# Patient Record
Sex: MALE | Race: WHITE | URBAN - METROPOLITAN AREA
[De-identification: names, ages, dates, MRNs, and addresses within clinical notes are randomized per-mention and may not be internally consistent; named-entity substitution may affect disease eponyms.]

---

## 2020-08-26 ENCOUNTER — NEW PATIENT (OUTPATIENT)
Dept: URBAN - METROPOLITAN AREA CLINIC 51 | Facility: CLINIC | Age: 30
End: 2020-08-26
Payer: COMMERCIAL

## 2020-08-26 DIAGNOSIS — H50.52 PRESBYOPIA: Primary | ICD-10-CM

## 2020-08-26 DIAGNOSIS — H04.123 TEAR FILM INSUFFICIENCY OF BILATERAL LACRIMAL GLANDS: ICD-10-CM

## 2020-08-26 DIAGNOSIS — Z96.1 PRESENCE OF INTRAOCULAR LENS: ICD-10-CM

## 2020-08-26 PROCEDURE — 92004 COMPRE OPH EXAM NEW PT 1/>: CPT | Performed by: OPTOMETRIST

## 2020-08-26 PROCEDURE — 92015 DETERMINE REFRACTIVE STATE: CPT | Performed by: OPTOMETRIST

## 2020-08-26 ASSESSMENT — KERATOMETRY
OS: 43.13
OD: 43.60

## 2020-08-26 ASSESSMENT — INTRAOCULAR PRESSURE
OS: 14
OD: 14

## 2020-08-26 ASSESSMENT — VISUAL ACUITY
OS: 20/CF 2'
OD: 20/20

## 2025-06-23 ENCOUNTER — APPOINTMENT (OUTPATIENT)
Dept: URBAN - METROPOLITAN AREA CLINIC 191 | Facility: CLINIC | Age: 35
Setting detail: DERMATOLOGY
End: 2025-06-23

## 2025-06-23 DIAGNOSIS — M95.0 ACQUIRED DEFORMITY OF NOSE: ICD-10-CM

## 2025-06-23 PROCEDURE — ? CONSULTATION FOR SURGICAL REPAIR

## 2025-06-25 ENCOUNTER — APPOINTMENT (OUTPATIENT)
Dept: URBAN - METROPOLITAN AREA CLINIC 191 | Facility: CLINIC | Age: 35
Setting detail: DERMATOLOGY
End: 2025-06-25

## 2025-06-25 DIAGNOSIS — M95.0 ACQUIRED DEFORMITY OF NOSE: ICD-10-CM

## 2025-06-25 PROCEDURE — ? CONSULTATION FOR SURGICAL REPAIR

## 2025-06-25 NOTE — PROCEDURE: CONSULTATION FOR SURGICAL REPAIR
Detail Level: Detailed
Other Plan: To restable the loose ligament a rhinoplasty would be an option but SDV did not suggest a rhinoplasty. Lasers were discussed, fraxel was recommended and use of silicone cream. Dermabrasion was not suggested.
X Size Of Defect In Cm (Optional): 0

## 2025-06-25 NOTE — HPI: SURGICAL CONSULTATION
Additional History: Patient got bite by a dog 11/23/24, Dr Bartlett was the plastic surgeon that did the surgery. Patient comes in with concerns for nose cartilage instability, patient states nasal tip feels loose. Patient has done 3 rounds of PDL in the past. Patient states breathing issues, more on left nostrils. Patient states decrease sense of smell \\nNo blood thinners, no diabetes, and non smoking.
Who Is Your Referring Physician?: .
What Is The Location Of The Lesion?: Nose